# Patient Record
Sex: MALE
[De-identification: names, ages, dates, MRNs, and addresses within clinical notes are randomized per-mention and may not be internally consistent; named-entity substitution may affect disease eponyms.]

---

## 2017-01-11 ENCOUNTER — IMAGING SERVICES (OUTPATIENT)
Dept: OTHER | Age: 82
End: 2017-01-11

## 2017-01-11 ENCOUNTER — HOSPITAL (OUTPATIENT)
Dept: OTHER | Age: 82
End: 2017-01-11
Attending: EMERGENCY MEDICINE

## 2017-04-13 ENCOUNTER — OFFICE VISIT (OUTPATIENT)
Dept: PULMONOLOGY | Facility: CLINIC | Age: 82
End: 2017-04-13

## 2017-04-13 VITALS
DIASTOLIC BLOOD PRESSURE: 68 MMHG | HEART RATE: 80 BPM | OXYGEN SATURATION: 98 % | BODY MASS INDEX: 25.48 KG/M2 | HEIGHT: 69 IN | SYSTOLIC BLOOD PRESSURE: 109 MMHG | TEMPERATURE: 98 F | WEIGHT: 172 LBS

## 2017-04-13 DIAGNOSIS — J44.9 CHRONIC OBSTRUCTIVE PULMONARY DISEASE, UNSPECIFIED COPD TYPE (HCC): Primary | ICD-10-CM

## 2017-04-13 PROBLEM — R05.9 COUGH: Status: ACTIVE | Noted: 2017-04-13

## 2017-04-13 PROCEDURE — 99204 OFFICE O/P NEW MOD 45 MIN: CPT | Performed by: INTERNAL MEDICINE

## 2017-04-13 PROCEDURE — G0463 HOSPITAL OUTPT CLINIC VISIT: HCPCS | Performed by: INTERNAL MEDICINE

## 2017-04-13 RX ORDER — QUETIAPINE 25 MG/1
25 TABLET, FILM COATED ORAL 2 TIMES DAILY
Refills: 2 | COMMUNITY
Start: 2017-04-11

## 2017-04-13 RX ORDER — BUDESONIDE 0.25 MG/2ML
0.25 INHALANT ORAL DAILY
Qty: 60 AMPULE | Refills: 5 | Status: SHIPPED | OUTPATIENT
Start: 2017-04-13 | End: 2017-04-13

## 2017-04-13 RX ORDER — IPRATROPIUM BROMIDE AND ALBUTEROL SULFATE 2.5; .5 MG/3ML; MG/3ML
3 SOLUTION RESPIRATORY (INHALATION) 2 TIMES DAILY
Qty: 60 VIAL | Refills: 5 | Status: SHIPPED | OUTPATIENT
Start: 2017-04-13 | End: 2017-10-24

## 2017-04-13 RX ORDER — DOXEPIN HYDROCHLORIDE 50 MG/1
1 CAPSULE ORAL DAILY
COMMUNITY

## 2017-04-13 RX ORDER — BUDESONIDE 0.25 MG/2ML
0.25 INHALANT ORAL 2 TIMES DAILY
Qty: 60 AMPULE | Refills: 5 | Status: SHIPPED | OUTPATIENT
Start: 2017-04-13 | End: 2017-10-24

## 2017-04-13 NOTE — H&P
Referring Physician  No primary care provider on file. Chief Complaint  Cough    History of Present Illness  Patient is a 80-year-old  male who presents with chief complaint of cough over the course of last month.   Per family patient with sever cough  2. Dyspnea with exertion  3. Likely COPD    Plan  -Patient presents today with one-month history of primarily nonproductive cough and dyspnea with exertion. His recent chest x-ray results are consistent with hyperinflation and COPD presentation.

## 2017-04-26 ENCOUNTER — TELEPHONE (OUTPATIENT)
Dept: PULMONOLOGY | Facility: CLINIC | Age: 82
End: 2017-04-26

## 2017-04-26 NOTE — TELEPHONE ENCOUNTER
Rn states nebulizer rx is not covered by ins co - asking for alternative - also pt no longer has cough and SOB - asking if pt still needs to take this rx

## 2017-04-27 NOTE — TELEPHONE ENCOUNTER
Dr. Nickolas Remy notified of below. Per Dr. Nickolas Remy, pt to still use nebulizer medication. Pt gave permission to speak with [facility]. Samaria notified of this and notified of below cost of medications.   Marleen notified to call Lilia Christianson [phone] to f/u on

## 2017-04-27 NOTE — TELEPHONE ENCOUNTER
Cally Gray states she is not from a home health agency and she is the pts caregiver. Cally Gray states pts nebulizer medication will cost over $400. Cally Gray states pt is no longer coughing but still has SOB when pt gets tired.   Cally Gray would like to know if pt

## 2017-05-25 ENCOUNTER — TELEPHONE (OUTPATIENT)
Dept: PULMONOLOGY | Facility: CLINIC | Age: 82
End: 2017-05-25

## 2017-05-25 NOTE — TELEPHONE ENCOUNTER
Caregiver states that the pt. Is not walking as much, and she wants to know what pts limitations are? Caregiver informed that office is closed.

## 2017-05-30 NOTE — TELEPHONE ENCOUNTER
You may let the caretaker know that he may use nebulizer device on as-needed basis. Also from a chest x-ray standpoint, if he is indeed doing better I do not feel strongly for having chest x-ray performed at this time.   I do not believe that ordering a ch

## 2017-05-30 NOTE — TELEPHONE ENCOUNTER
Pt caregiver Jakub cadena they brought pt to PCP over weekend re: acute issues and pt is doing better.  Caregiver asks if pt needs to continue using nebulizer medications, currently taking DuoNeb BID and Budesonide BID, Marleen cadena \"it doesn't seem to DR ANDRE BAUTISTA

## 2017-08-31 ENCOUNTER — OFFICE VISIT (OUTPATIENT)
Dept: PULMONOLOGY | Facility: CLINIC | Age: 82
End: 2017-08-31

## 2017-08-31 VITALS
HEIGHT: 69 IN | DIASTOLIC BLOOD PRESSURE: 70 MMHG | SYSTOLIC BLOOD PRESSURE: 111 MMHG | RESPIRATION RATE: 16 BRPM | OXYGEN SATURATION: 95 % | HEART RATE: 70 BPM

## 2017-08-31 DIAGNOSIS — R05.9 COUGH: Primary | ICD-10-CM

## 2017-08-31 PROCEDURE — G0463 HOSPITAL OUTPT CLINIC VISIT: HCPCS | Performed by: INTERNAL MEDICINE

## 2017-08-31 PROCEDURE — 99213 OFFICE O/P EST LOW 20 MIN: CPT | Performed by: INTERNAL MEDICINE

## 2017-08-31 NOTE — PROGRESS NOTES
Referring Physician  Ivon Carlson    History of Present Illness  Patient seen today for follow-up visit at pulmonary clinic. Patient with underlying severe dementia.   Per caretaker and wife they deny any significant cough or respiratory distress/dyspne as-needed basis and now has not used it for several months.   I instructed them to contact us if he develops cough in the future.     Follow Up  In 4 months    Reji Canseco, [address] [place] [address]  8/31/2017  2:17 PM

## 2017-10-19 ENCOUNTER — TELEPHONE (OUTPATIENT)
Dept: PULMONOLOGY | Facility: CLINIC | Age: 82
End: 2017-10-19

## 2017-10-19 RX ORDER — LEVOFLOXACIN 750 MG/1
750 TABLET ORAL DAILY
Qty: 10 TABLET | Refills: 0 | Status: SHIPPED | OUTPATIENT
Start: 2017-10-19

## 2017-10-19 NOTE — TELEPHONE ENCOUNTER
Marleen/Caregiver is requesting an appt with [address] sooner than first available due to pt coughing. Bessy De La Garza would like to know what she can do for the pt to help with coughing.  Please call thank you

## 2017-10-19 NOTE — TELEPHONE ENCOUNTER
Spoke with pt wife (CORBY on file, pt has dementia) who gave permission to speak with pt caregiver Marleen. Katherine Rogel reports pt has cough with green sputum production x 1 week, denies fever, denies SOB.  Katherine Rogel reports pt can not get phlegm up all the time an

## 2017-10-23 ENCOUNTER — TELEPHONE (OUTPATIENT)
Dept: PULMONOLOGY | Facility: CLINIC | Age: 82
End: 2017-10-23

## 2017-10-23 NOTE — TELEPHONE ENCOUNTER
Wife states pt still has productive cough for over 1 week. Wife states she does not know if the Levaquin has helped the pts cough. Wife states pt has Alzheimer's. See TE 10-19-17, pts sputum was green. Wife states pts sputum is now white.   Wife denies SOB

## 2017-10-24 ENCOUNTER — OFFICE VISIT (OUTPATIENT)
Dept: PULMONOLOGY | Facility: CLINIC | Age: 82
End: 2017-10-24

## 2017-10-24 ENCOUNTER — HOSPITAL ENCOUNTER (OUTPATIENT)
Dept: RESPIRATORY THERAPY | Facility: HOSPITAL | Age: 82
Discharge: HOME | End: 2017-10-24
Attending: INTERNAL MEDICINE
Payer: MEDICARE

## 2017-10-24 ENCOUNTER — HOSPITAL ENCOUNTER (OUTPATIENT)
Dept: GENERAL RADIOLOGY | Facility: HOSPITAL | Age: 82
Discharge: HOME | End: 2017-10-24
Attending: INTERNAL MEDICINE | Admitting: INTERNAL MEDICINE
Payer: MEDICARE

## 2017-10-24 VITALS
RESPIRATION RATE: 12 BRPM | BODY MASS INDEX: 25 KG/M2 | SYSTOLIC BLOOD PRESSURE: 102 MMHG | HEART RATE: 76 BPM | DIASTOLIC BLOOD PRESSURE: 63 MMHG | WEIGHT: 167 LBS

## 2017-10-24 DIAGNOSIS — R05.9 COUGH: Primary | ICD-10-CM

## 2017-10-24 DIAGNOSIS — R09.02 HYPOXIA: ICD-10-CM

## 2017-10-24 DIAGNOSIS — R05.9 COUGH: ICD-10-CM

## 2017-10-24 PROCEDURE — 36415 COLL VENOUS BLD VENIPUNCTURE: CPT | Performed by: INTERNAL MEDICINE

## 2017-10-24 PROCEDURE — 71010 XR CHEST AP/PA (1 VIEW) (CPT=71010): CPT | Performed by: INTERNAL MEDICINE

## 2017-10-24 PROCEDURE — 99213 OFFICE O/P EST LOW 20 MIN: CPT | Performed by: INTERNAL MEDICINE

## 2017-10-24 PROCEDURE — 36600 WITHDRAWAL OF ARTERIAL BLOOD: CPT | Performed by: INTERNAL MEDICINE

## 2017-10-24 PROCEDURE — 82805 BLOOD GASES W/O2 SATURATION: CPT | Performed by: INTERNAL MEDICINE

## 2017-10-24 PROCEDURE — G0463 HOSPITAL OUTPT CLINIC VISIT: HCPCS | Performed by: INTERNAL MEDICINE

## 2017-10-24 RX ORDER — IPRATROPIUM BROMIDE AND ALBUTEROL SULFATE 2.5; .5 MG/3ML; MG/3ML
3 SOLUTION RESPIRATORY (INHALATION) 2 TIMES DAILY
Qty: 60 VIAL | Refills: 5 | Status: SHIPPED | OUTPATIENT
Start: 2017-10-24

## 2017-10-24 RX ORDER — BUDESONIDE 0.25 MG/2ML
0.25 INHALANT ORAL 2 TIMES DAILY
Qty: 60 AMPULE | Refills: 5 | Status: SHIPPED | OUTPATIENT
Start: 2017-10-24 | End: 2017-10-25

## 2017-10-24 NOTE — PROGRESS NOTES
Referring Physician  Dinesh Moon    History of Present Illness  Patient seen today for follow-up visit at pulmonary clinic. Patient with underlying severe dementia.   Per caretaker and wife they state that over the course of last several days patient h also with evidence of hypoxia noted at office visit but extremities are very cold. I will obtain ABG and if evidence of hypoxemia present, I will set him up with home O2.   From a cough standpoint, advised him to finish course of Levaquin therapy and I hav

## 2017-10-25 ENCOUNTER — TELEPHONE (OUTPATIENT)
Dept: PULMONOLOGY | Facility: CLINIC | Age: 82
End: 2017-10-25

## 2017-10-25 RX ORDER — BUDESONIDE 0.25 MG/2ML
0.25 INHALANT ORAL 2 TIMES DAILY
Qty: 60 AMPULE | Refills: 5 | Status: SHIPPED | OUTPATIENT
Start: 2017-10-25

## 2017-10-27 ENCOUNTER — TELEPHONE (OUTPATIENT)
Dept: PULMONOLOGY | Facility: CLINIC | Age: 82
End: 2017-10-27

## 2017-10-27 NOTE — TELEPHONE ENCOUNTER
----- Message from Kamryn Rojas DO sent at 10/25/2017  9:06 AM CDT -----  You may let the patient's caretaker know that I reviewed his chest x-ray and ABG results. His oxygen saturation per ABG is 96%.   His chest x-ray reveals very subtle left basil

## 2017-10-30 ENCOUNTER — TELEPHONE (OUTPATIENT)
Dept: PULMONOLOGY | Facility: CLINIC | Age: 82
End: 2017-10-30

## 2017-10-30 NOTE — TELEPHONE ENCOUNTER
Spoke with pt wife Lory Martino, she reports they discontinued nebulizer meds as \"they made pt agitated, pt couldn't stay still and I couldn't put up with that\".  Pt wife complains of ongoing cough, pt unable to expel phlegm, finished antibiotic yesterday, Pt

## 2017-10-30 NOTE — TELEPHONE ENCOUNTER
Sal Nath states pt was taken off of Nebulizer and he was acting \"strange. \"  States pt still is coughing and requesting what other treatments pt can try. Pls call - aware [address] is not in office. Thank you.

## 2017-10-30 NOTE — TELEPHONE ENCOUNTER
Recommend f/u Dr March Height on 11/3  If pt gets worse wife should bring pt to ER or call [phone]

## 2017-11-03 ENCOUNTER — OFFICE VISIT (OUTPATIENT)
Dept: PULMONOLOGY | Facility: CLINIC | Age: 82
End: 2017-11-03

## 2017-11-03 ENCOUNTER — TELEPHONE (OUTPATIENT)
Dept: PULMONOLOGY | Facility: CLINIC | Age: 82
End: 2017-11-03

## 2017-11-03 VITALS — RESPIRATION RATE: 18 BRPM | HEIGHT: 69 IN

## 2017-11-03 DIAGNOSIS — R13.10 DYSPHAGIA, UNSPECIFIED TYPE: Primary | ICD-10-CM

## 2017-11-03 DIAGNOSIS — R05.9 COUGH: ICD-10-CM

## 2017-11-03 PROCEDURE — 99213 OFFICE O/P EST LOW 20 MIN: CPT | Performed by: INTERNAL MEDICINE

## 2017-11-03 PROCEDURE — G0463 HOSPITAL OUTPT CLINIC VISIT: HCPCS | Performed by: INTERNAL MEDICINE

## 2017-11-03 RX ORDER — FLUTICASONE PROPIONATE 50 MCG
1 SPRAY, SUSPENSION (ML) NASAL 2 TIMES DAILY
Qty: 1 INHALER | Refills: 4 | Status: SHIPPED | OUTPATIENT
Start: 2017-11-03 | End: 2017-12-03

## 2017-11-03 RX ORDER — OMEPRAZOLE 40 MG/1
40 CAPSULE, DELAYED RELEASE ORAL DAILY
Qty: 30 CAPSULE | Refills: 1 | Status: SHIPPED | OUTPATIENT
Start: 2017-11-03

## 2017-11-03 NOTE — TELEPHONE ENCOUNTER
Informed Elizabeth Thania that rx is for hospital bed and diagnoses are listed. She verbalized understanding.

## 2017-11-03 NOTE — PROGRESS NOTES
Referring Physician  Doc Kawasaki    History of Present Illness  Patient seen today for follow-up visit at pulmonary clinic. Patient with underlying severe dementia.   His wife and caretaker state that his cough is not improved after finishing course of lymphadenopathy     Assessment  1. Cough  2. Severe dementia       Plan  -Patient presents for follow-up visit for his cough. Wife and caretaker states that patient has been having increased cough over the course last several days.   He has finished cour

## 2017-11-03 NOTE — TELEPHONE ENCOUNTER
[facility] needs clarification on what type of equipment? Pls call at:[phone],thanks.

## 2017-11-08 ENCOUNTER — TELEPHONE (OUTPATIENT)
Dept: PULMONOLOGY | Facility: CLINIC | Age: 82
End: 2017-11-08

## 2017-11-08 NOTE — TELEPHONE ENCOUNTER
Pt's wife gave consent for our office to speak w/ Janessa Gusman (caregiver). Explained spoke w/ [facility], likelihood of hospital bed not being covered was discussed @ last office visit, msg sent to Dr. Flakita Rios, & will f/u once response rcvd.  She verbalized understandi

## 2017-11-08 NOTE — TELEPHONE ENCOUNTER
Wife Wife requesting update on hospital bed with [facility]. Did not initially see other TE from today. Wife notified Sheela Carlisle RN is working on this and will have her call wife.

## 2017-11-08 NOTE — TELEPHONE ENCOUNTER
Bryan Redo if pt has hx of COPD or any form of aspiration that these are qualifying diagnoses & pt's wife insists pt has COPD. She stts she did tell pt's wife that as of now hospital bed not covered and private pay price ($119.00 per mo) was given to pt.  E

## 2017-11-09 NOTE — TELEPHONE ENCOUNTER
Notified pt's wife of Dr. Roxana Johnson orders. Samanta Vasquez she will have to rent hospital bed for pt. Explained per Baldemar Tee @ [facility] private pay price was given ($119.00 per mo) & I will ask Baldemar Tee to call her once she returns my call.  She verbalized understandin

## 2017-11-09 NOTE — TELEPHONE ENCOUNTER
I do not believe from my standpoint there is any other documentation or diagnoses that I may list that may potentially cover hospital bed for the patient.

## 2017-11-10 NOTE — TELEPHONE ENCOUNTER
Marcia/[facility] returning RN call. Was unable to reach RN.  Mallory Gilliland states she spoke to pts wife yesterday and they decided to do private pay for the hospital bed that is being delivered today  Thank you

## 2017-11-28 ENCOUNTER — TELEPHONE (OUTPATIENT)
Dept: PULMONOLOGY | Facility: CLINIC | Age: 82
End: 2017-11-28

## 2017-11-28 NOTE — TELEPHONE ENCOUNTER
Pts wife states they were unable to make appt this morning for throat X-RAY due to the fact they could not get the pt out of the house. Wife states pt has not been out of the house in over a week. Wife state she has stairs and pt is afraid to take stairs.

## 2017-11-28 NOTE — TELEPHONE ENCOUNTER
I would prefer the patient to obtain a swallow evaluation and consultation with speech pathology before further recommendations are made. You may inquire about what the status regarding speech therapy evaluation is.

## 2017-11-29 NOTE — TELEPHONE ENCOUNTER
Pt wife informed of [facility] msg as detailed below, pt wife was instructed to call pt PCP re: not being able to get patient out of the house, as home care or further assistance sounds to be needed.  Pt wife aware from a pulmonary standpoint the swallow study and

## (undated) NOTE — MR AVS SNAPSHOT
[address]  [phone]               Thank you for choosing us for your health care visit with Lisa Gonsalez DO. We are glad to serve you and happy to provide you with this summary of your visit. your Zip Code and Date of Birth to complete the sign-up process. If you do not sign up before the expiration date, you must request a new code.     Your unique IMN Access Code: [id number]  Expires: 6/12/2017  3:28 PM    If you have questions, you can c